# Patient Record
Sex: FEMALE | Race: WHITE | NOT HISPANIC OR LATINO | URBAN - METROPOLITAN AREA
[De-identification: names, ages, dates, MRNs, and addresses within clinical notes are randomized per-mention and may not be internally consistent; named-entity substitution may affect disease eponyms.]

---

## 2025-04-13 ENCOUNTER — OFFICE VISIT (OUTPATIENT)
Dept: URGENT CARE | Age: 55
End: 2025-04-13
Payer: COMMERCIAL

## 2025-04-13 DIAGNOSIS — N30.00 ACUTE CYSTITIS WITHOUT HEMATURIA: Primary | ICD-10-CM

## 2025-04-13 RX ORDER — NITROFURANTOIN 25; 75 MG/1; MG/1
100 CAPSULE ORAL EVERY 12 HOURS SCHEDULED
Qty: 14 CAPSULE | Refills: 0 | Status: SHIPPED | OUTPATIENT
Start: 2025-04-13 | End: 2025-04-20

## 2025-04-13 NOTE — PROGRESS NOTES
Urgent Care Virtual Video Visit    54-year-old patient presents virtually with complaints of pelvic pressure with associated bladder fullness, urinary urgency, urinary hesitancy, urinary frequency ongoing for the past 2 to 3 days.   Reports no treatments tried.   Reports not sexually active.  Reports no possibility of STI/STD.   Reports no possibility of pregnancy. Denies fevers, chills, nausea, vomiting, flank pain, hematuria, vaginal discharge.    General: Awake, alert and oriented. No acute distress. Well developed, and nourished. Appears stated age.  Head: The head is normocephalic and atraumatic.  Respiratory:  No signs of respiratory distress. No stridor, No conversational dyspnea.   Psychiatric: Appropriate mood and affect. Good judgement and insight.      pelvic pressure with associated bladder fullness, urinary urgency, urinary hesitancy, urinary frequency ongoing for the past 2 to 3 days.   Patient is unable to stop by urgent care clinic to drop off urine as patient works in the next hour and does not get off work until after 8 PM.   Symptoms are consistent with UTI.  Macrobid started.  Advised if symptoms are not improving over the next 2 to 3 days follow-up with PCP or at urgent care clinic.  Patient expressed understanding. In the event of high fever, pain, worsening of symptoms, nausea/vomiting pt should seek urgent/emergent medical treatment. Risk, benefits, and potential side effects of medication(s) discussed with pt. Discussed disease/illness presentation, treatment options, progression, complications, and outcomes with patient. Pt. Has expressed understanding and is an agreement of plan of care.     Patient Location: [facility]  Provider Location: [facility]    I have communicated my name and active licensure. Video visit completed with realtime synchronous video/audio connection. Informed consent was obtained from the patient. Patient was made aware that my evaluation and  diagnosis are limited due to the fact that we are not in the same room during the interview and that this is a virtual encounter that took place via videoconferencing. Patient verbalized understanding.     Patient disposition: Home    Electronically signed by Vidhi Campos PA-C  10:23 AM

## 2025-05-16 ENCOUNTER — HOSPITAL ENCOUNTER (OUTPATIENT)
Dept: RADIOLOGY | Facility: CLINIC | Age: 55
Discharge: HOME | End: 2025-05-16
Payer: COMMERCIAL

## 2025-05-16 VITALS — BODY MASS INDEX: 28.89 KG/M2 | HEIGHT: 62 IN | WEIGHT: 157 LBS

## 2025-05-16 DIAGNOSIS — Z12.31 ENCOUNTER FOR SCREENING MAMMOGRAM FOR MALIGNANT NEOPLASM OF BREAST: ICD-10-CM

## 2025-05-16 PROCEDURE — 77063 BREAST TOMOSYNTHESIS BI: CPT

## 2025-05-22 ENCOUNTER — HOSPITAL ENCOUNTER (OUTPATIENT)
Dept: RADIOLOGY | Facility: EXTERNAL LOCATION | Age: 55
Discharge: HOME | End: 2025-05-22

## 2025-06-09 ENCOUNTER — APPOINTMENT (OUTPATIENT)
Dept: PRIMARY CARE | Facility: CLINIC | Age: 55
End: 2025-06-09
Payer: COMMERCIAL

## 2025-06-09 VITALS
BODY MASS INDEX: 28.93 KG/M2 | DIASTOLIC BLOOD PRESSURE: 85 MMHG | HEART RATE: 67 BPM | OXYGEN SATURATION: 98 % | TEMPERATURE: 97.3 F | WEIGHT: 157.2 LBS | SYSTOLIC BLOOD PRESSURE: 135 MMHG | HEIGHT: 62 IN

## 2025-06-09 DIAGNOSIS — E78.00 SERUM CHOLESTEROL ELEVATED: ICD-10-CM

## 2025-06-09 DIAGNOSIS — F17.200 NICOTINE DEPENDENCE, UNCOMPLICATED, UNSPECIFIED NICOTINE PRODUCT TYPE: ICD-10-CM

## 2025-06-09 DIAGNOSIS — Z72.0 VAPES NICOTINE CONTAINING SUBSTANCE: ICD-10-CM

## 2025-06-09 DIAGNOSIS — Z76.89 ENCOUNTER TO ESTABLISH CARE WITH NEW DOCTOR: Primary | ICD-10-CM

## 2025-06-09 DIAGNOSIS — R03.0 ELEVATED BLOOD PRESSURE READING: ICD-10-CM

## 2025-06-09 DIAGNOSIS — Z13.1 SCREENING FOR DIABETES MELLITUS: ICD-10-CM

## 2025-06-09 DIAGNOSIS — Z13.89 SCREENING FOR OBESITY: ICD-10-CM

## 2025-06-09 DIAGNOSIS — N95.1 MENOPAUSE SYNDROME: ICD-10-CM

## 2025-06-09 DIAGNOSIS — L65.9 HAIR LOSS: ICD-10-CM

## 2025-06-09 RX ORDER — VENLAFAXINE HYDROCHLORIDE 37.5 MG/1
37.5 CAPSULE, EXTENDED RELEASE ORAL DAILY
Qty: 90 CAPSULE | Refills: 0 | Status: SHIPPED | OUTPATIENT
Start: 2025-06-09 | End: 2025-09-07

## 2025-06-09 ASSESSMENT — PATIENT HEALTH QUESTIONNAIRE - PHQ9
2. FEELING DOWN, DEPRESSED OR HOPELESS: NOT AT ALL
SUM OF ALL RESPONSES TO PHQ9 QUESTIONS 1 AND 2: 0
1. LITTLE INTEREST OR PLEASURE IN DOING THINGS: NOT AT ALL

## 2025-06-09 NOTE — PROGRESS NOTES
Subjective   Patient ID: Holli Garcia is a 54 y.o. female who presents for Establish Care (Discuss blood work and hormone concerns).    HPI Pt is a pleasant 53 yo CF who was seen and evaluated during the office visit as a new pt to establish. Pt states that she is a healthy person and does not take any medication. During the clinical encounter pt shared that she was seen and evaluated by the GYN for the menopause  sxs as hot flashes, mood swings, gain weight, sleep disturbances. Pt states that she has these sxs for the years. Pt recently had some BW done with GYN to check the sex hormones. The cholesterol panel was done as well and showed elevated cholesterol level. Pt states that her insurance plan does not cover for the HRT Tx and pt is looking for the alternative treatment option of the menopause sxs.   During the clinical encounter pt denies fever, chills, no SOB, no chest pain/tightness, no abdominal pain, no N/V/D reported, no change of urination reported.  Pt also mentioned about hair loss since 2020. Pt denies any other health concerns during this visit.  Sohx: current every day nicotine user (vape)  List of the medications and allergies: reviewed  PMHx: reviewed  Fhx: no Fhx of colon/breast CA    Review of Systems  The systems have been reviewed as follows:   Constitutional: no fever, no chills, but as mentioned  Eyes: no eyesight problems, no eye redness, no eye pain, no blurred vision  ENT: no ear pain or sore throat, no nasal discharge or congestion, no sinus pressure, no hearing loss  Cardiovascular: no chest pain or tightness, no SOB, the heart rate was not fast or slow  Respiratory: no cough, no dyspnea with exertion or with rest, no wheezing  Gastrointestinal: no abdominal pain, no constipation or diarrhea, no heartburn, no nausea or vomiting  Genitourinary: no urine frequency, no dysuria, no urinary urgency, no urinary incontinence or retention  Musculoskeletal: no back pain, no arthralgia, no  "joint swelling or stiffness, no muscle weakness  Integumentary: no skin lesions or rash  Neurological: no headaches, no dizziness or fainting, no limb weakness  Psychiatric: no anxiety or depression, no SI/HI, but as mentioned  Endocrine: no temperature intolerance, no   change of appetite  Hematologic/Lymphatic: no easy bruising or bleeding  Objective   /76 (BP Location: Left arm, Patient Position: Sitting)   Pulse 67   Temp 36.3 °C (97.3 °F) (Skin)   Ht 1.575 m (5' 2\")   Wt 71.3 kg (157 lb 3.2 oz)   SpO2 98%   BMI 28.75 kg/m²     Physical Exam  Constitutional: well hydrated, well nourished, no acute distress. Vital signs reviewed  Head and face: normocephalic, atraumatic  Eyes: no erythema, edema or visible abnormalities of conjunctiva or lids. Pupils are equal, round and reactive to light. Extraocular movement normal  ENT: external ears without deformities, external ear canals without redness, swelling or tenderness. TMs normal color, normal landmarks, no fluid, non-retracted  Neck: full ROM, no adenopathy, neck was supple, thyroid gland not enlarged, no palpable nodules  Pulmonary: normal respiratory rate and effort. Lungs are clear to auscultation, no rales,no rhonchi or wheezing  Cardiovascular: RRR, normal S1 and S2, no murmur, no gallop, posterior tib. pulse normal 2+ bilaterally, no lower extremity edema  Abdomen: soft, non tender on palpation, not distended, normal BS in all 4 quadrants, no CVA tenderness  Musculoskeletal: no joint swelling, normal movements of all 4 extremities. Gait and station: normal, Digits and nails: normal without clubbing  Skin: normal color, no rash  Neurologic: Cranial nerves: CN II: visual acuity intact. Source: acuity reported by patient. Pupils reactive to light with normal accommodation. Right and left pupil normal CN III, IV and VI: the EO movements were intact. CN VIII: no hearing loss. Deep tendon reflexes: were 2+ and symmetric: R and L patella.  Sensory exam: " normal to light touch  Psychiatric: Mood and affect: normal, Alert and Oriented x 3  Lymphatic: no cervical lymphadenopathy  Assessment/Plan   New pt to establish:  Hx and PE as mentioned  Pt will need AWV    Obesity screening: BMI of 28 and pt will be beneficial form the lifestyle modification  Elevated BP readings: I rechecked the pts BP as documented at EMR. The readings material about the normal BP and how to check it on the regular basis was provided directly to the pt. Will also screen the pt for DM. Pt will share the recent BW results with us. Will check CMP  Elevated cholesterol level: pt was advised about the healthy. Low fat diet  Menopause sxs: will try venlafaxine 37.5 mg po daily x 60 days with close FU OV for the sxs management. Pt was educated about possible SE  Hair loss: will check CBC, Irin panel+ferritin, EDWARD, ESR/CRP  Every day smoker:  Tobacco cessation:  I have counseled the  patient about the need for smoking cessation and how I can support efforts when pt is ready to quit. D/c nicotine replacement therapy, pharmacological option (Bupropion and etc), alternative treatment - hypnosis, support groups, acupuncture as potential options. Pt currently has no signs/symptoms of tobacco related disease. Pt declined any intervention for now  I discussed every sxs with the pt in detail. Pt verbalized understanding of the health  condition and agreed with the clinical approach as discussed as mentioned above  Please FU with PCP as planned or sooner if needed.

## 2025-06-13 LAB
25(OH)D3+25(OH)D2 SERPL-MCNC: 23 NG/ML (ref 30–100)
ALBUMIN SERPL-MCNC: 4.4 G/DL (ref 3.6–5.1)
ALP SERPL-CCNC: 72 U/L (ref 37–153)
ALT SERPL-CCNC: 23 U/L (ref 6–29)
ANA PAT SER IF-IMP: ABNORMAL
ANA PAT SER IF-IMP: ABNORMAL
ANA SER QL IF: POSITIVE
ANA TITR SER IF: ABNORMAL TITER
ANA TITR SER IF: ABNORMAL TITER
ANION GAP SERPL CALCULATED.4IONS-SCNC: 9 MMOL/L (CALC) (ref 7–17)
AST SERPL-CCNC: 14 U/L (ref 10–35)
BASOPHILS # BLD AUTO: 32 CELLS/UL (ref 0–200)
BASOPHILS NFR BLD AUTO: 0.7 %
BILIRUB SERPL-MCNC: 0.3 MG/DL (ref 0.2–1.2)
BUN SERPL-MCNC: 17 MG/DL (ref 7–25)
CALCIUM SERPL-MCNC: 9.3 MG/DL (ref 8.6–10.4)
CHLORIDE SERPL-SCNC: 106 MMOL/L (ref 98–110)
CO2 SERPL-SCNC: 26 MMOL/L (ref 20–32)
CREAT SERPL-MCNC: 0.62 MG/DL (ref 0.5–1.03)
CRP SERPL HS-MCNC: 2.8 MG/L
EGFRCR SERPLBLD CKD-EPI 2021: 104 ML/MIN/1.73M2
EOSINOPHIL # BLD AUTO: 451 CELLS/UL (ref 15–500)
EOSINOPHIL NFR BLD AUTO: 9.8 %
ERYTHROCYTE [DISTWIDTH] IN BLOOD BY AUTOMATED COUNT: 12.3 % (ref 11–15)
ERYTHROCYTE [SEDIMENTATION RATE] IN BLOOD BY WESTERGREN METHOD: 11 MM/H
EST. AVERAGE GLUCOSE BLD GHB EST-MCNC: 114 MG/DL
EST. AVERAGE GLUCOSE BLD GHB EST-SCNC: 6.3 MMOL/L
FERRITIN SERPL-MCNC: 48 NG/ML (ref 16–232)
GLUCOSE SERPL-MCNC: 94 MG/DL (ref 65–99)
HBA1C MFR BLD: 5.6 %
HCT VFR BLD AUTO: 43 % (ref 35–45)
HGB BLD-MCNC: 14.1 G/DL (ref 11.7–15.5)
IRON SATN MFR SERPL: 28 % (CALC) (ref 16–45)
IRON SERPL-MCNC: 94 MCG/DL (ref 45–160)
LYMPHOCYTES # BLD AUTO: 1408 CELLS/UL (ref 850–3900)
LYMPHOCYTES NFR BLD AUTO: 30.6 %
MCH RBC QN AUTO: 29.1 PG (ref 27–33)
MCHC RBC AUTO-ENTMCNC: 32.8 G/DL (ref 32–36)
MCV RBC AUTO: 88.8 FL (ref 80–100)
MONOCYTES # BLD AUTO: 460 CELLS/UL (ref 200–950)
MONOCYTES NFR BLD AUTO: 10 %
NEUTROPHILS # BLD AUTO: 2249 CELLS/UL (ref 1500–7800)
NEUTROPHILS NFR BLD AUTO: 48.9 %
PLATELET # BLD AUTO: 291 THOUSAND/UL (ref 140–400)
PMV BLD REES-ECKER: 9.7 FL (ref 7.5–12.5)
POTASSIUM SERPL-SCNC: 4.9 MMOL/L (ref 3.5–5.3)
PROT SERPL-MCNC: 6.8 G/DL (ref 6.1–8.1)
RBC # BLD AUTO: 4.84 MILLION/UL (ref 3.8–5.1)
SODIUM SERPL-SCNC: 141 MMOL/L (ref 135–146)
TIBC SERPL-MCNC: 336 MCG/DL (CALC) (ref 250–450)
WBC # BLD AUTO: 4.6 THOUSAND/UL (ref 3.8–10.8)

## 2025-06-16 ENCOUNTER — TELEPHONE (OUTPATIENT)
Dept: PRIMARY CARE | Facility: CLINIC | Age: 55
End: 2025-06-16
Payer: COMMERCIAL

## 2025-06-16 NOTE — TELEPHONE ENCOUNTER
----- Message from Mariajose Call sent at 6/14/2025  1:52 PM EDT -----  The recent BW showed positive BW results for the autoimmune disorders and decreased vitamin D level. Please schedule the FU visit to discuss the bw results (VV will be ok).  ----- Message -----  From: Akbar Onfido Results In  Sent: 6/12/2025   3:59 AM EDT  To: Mariajose Call MD

## 2025-07-07 ENCOUNTER — APPOINTMENT (OUTPATIENT)
Dept: PRIMARY CARE | Facility: CLINIC | Age: 55
End: 2025-07-07
Payer: COMMERCIAL

## 2025-07-07 VITALS
DIASTOLIC BLOOD PRESSURE: 86 MMHG | SYSTOLIC BLOOD PRESSURE: 137 MMHG | WEIGHT: 152.4 LBS | HEART RATE: 72 BPM | BODY MASS INDEX: 28.05 KG/M2 | TEMPERATURE: 97.9 F | HEIGHT: 62 IN

## 2025-07-07 DIAGNOSIS — E66.3 OVERWEIGHT WITH BODY MASS INDEX (BMI) OF 27 TO 27.9 IN ADULT: ICD-10-CM

## 2025-07-07 DIAGNOSIS — N95.1 MENOPAUSE SYNDROME: ICD-10-CM

## 2025-07-07 DIAGNOSIS — R76.8 ANTINUCLEAR ANTIBODY (ANA) POSITIVE: ICD-10-CM

## 2025-07-07 DIAGNOSIS — Z72.0 VAPES NICOTINE CONTAINING SUBSTANCE: ICD-10-CM

## 2025-07-07 DIAGNOSIS — Z71.2 ENCOUNTER TO DISCUSS TEST RESULTS: Primary | ICD-10-CM

## 2025-07-07 DIAGNOSIS — R03.0 ELEVATED BLOOD PRESSURE READING WITHOUT DIAGNOSIS OF HYPERTENSION: ICD-10-CM

## 2025-07-07 DIAGNOSIS — E55.9 VITAMIN D INSUFFICIENCY: ICD-10-CM

## 2025-07-07 DIAGNOSIS — Z71.6 ENCOUNTER FOR SMOKING CESSATION COUNSELING: ICD-10-CM

## 2025-07-07 PROCEDURE — 3008F BODY MASS INDEX DOCD: CPT | Performed by: INTERNAL MEDICINE

## 2025-07-07 PROCEDURE — 4004F PT TOBACCO SCREEN RCVD TLK: CPT | Performed by: INTERNAL MEDICINE

## 2025-07-07 PROCEDURE — 99215 OFFICE O/P EST HI 40 MIN: CPT | Performed by: INTERNAL MEDICINE

## 2025-07-07 PROCEDURE — 99406 BEHAV CHNG SMOKING 3-10 MIN: CPT | Performed by: INTERNAL MEDICINE

## 2025-07-07 RX ORDER — ERGOCALCIFEROL 1.25 MG/1
1.25 CAPSULE ORAL WEEKLY
Qty: 12 CAPSULE | Refills: 0 | Status: SHIPPED | OUTPATIENT
Start: 2025-07-07 | End: 2025-09-29

## 2025-07-07 ASSESSMENT — PATIENT HEALTH QUESTIONNAIRE - PHQ9
1. LITTLE INTEREST OR PLEASURE IN DOING THINGS: NOT AT ALL
2. FEELING DOWN, DEPRESSED OR HOPELESS: NOT AT ALL
SUM OF ALL RESPONSES TO PHQ9 QUESTIONS 1 AND 2: 0

## 2025-07-07 NOTE — PROGRESS NOTES
"Subjective   Patient ID: Holli Garcia is a 54 y.o. female who presents for Follow-up (Elevated Bp, nicotine dependence).    HPI Pt is a pleasant 53 yo CF who was seen and evaluated during the FU OV. Pt recently had the BW done and would like to discuss the results in details. During the clinical encounter pt reports significant improvement of the menopause sxs on Effexor  37.5 mg PO daily. During the clinical encounter pt denies fever, chills, no SOB, no chest pain/tightness, no abdominal pain, no N/V/D reported, no change of urination reported. Pt denies any other health concerns during this visit.  Sohx: reviewed  List of the medications and allergies: reviewed  Fhx  and PMHx: reviewed    Review of Systems  The systems have been reviewed as follows:   Constitutional: no fever, no chills  Eyes: no eyesight problems, no eye redness, no eye pain, no blurred vision  ENT: no ear pain or sore throat, no nasal discharge or congestion, no sinus pressure, no hearing loss  Cardiovascular: no chest pain or tightness, no SOB, the heart rate was not fast or slow  Respiratory: no cough, no dyspnea with exertion or with rest, no wheezing  Gastrointestinal: no abdominal pain, no constipation or diarrhea, no heartburn, no nausea or vomiting  Genitourinary: no urine frequency, no dysuria, no urinary urgency, no urinary incontinence or retention  Musculoskeletal: no back pain, no arthralgia, no joint swelling or stiffness, no muscle weakness  Integumentary: no skin lesions or rash  Neurological: no headaches, no dizziness or fainting, no limb weakness  Psychiatric: no mood changes, no anxiety or depression, no SI/HI  Endocrine: no weight change, no temperature intolerance, no change of appetite  Hematologic/Lymphatic: no easy bruising or bleeding    Objective   /86 (BP Location: Left arm, Patient Position: Sitting)   Pulse 72   Temp 36.6 °C (97.9 °F)   Ht 1.575 m (5' 2\")   Wt 69.1 kg (152 lb 6.4 oz)   BMI 27.87 kg/m² "     Physical Exam  Constitutional: well hydrated, well nourished, no acute distress. Vital signs reviewed  Head and face: normocephalic, atraumatic  Eyes: no erythema, edema or visible abnormalities of conjunctiva or lids. Pupils are equal, round and reactive to light. Extraocular movement normal  ENT: external ears without deformities  Neck: full ROM, no adenopathy, neck was supple, thyroid gland not enlarged  Pulmonary: normal respiratory rate and effort. Lungs are clear to auscultation, no rales,no rhonchi or wheezing  Cardiovascular: RRR, normal S1 and S2, no murmur, no gallop, posterior tib. pulse normal 2+ bilaterally, no lower extremity edema  Abdomen: soft, non tender on palpation, not distended, normal BS in all 4 quadrants, no CVA tenderness  Musculoskeletal: no joint swelling, normal movements of all 4 extremities. Gait and station: normal, Digits and nails: normal without clubbing  Skin: normal color, no rash  Neurologic: Cranial nerves: CN II: visual acuity intact. Source: acuity reported by patient. Pupils reactive to light with normal accommodation. Right and left pupil normal CN III, IV and VI: the EO movements were intact. CN VIII: no hearing loss. Deep tendon reflexes: were 2+ and symmetric: R and L patella.  Sensory exam: normal to light touch  Psychiatric: Mood and affect: normal, Alert and Oriented x 3  Lymphatic: no cervical lymphadenopathy    Assessment/Plan   Encounter to discuss the recent test results:  I reviewed, shared and discussed the recent test results with the pt in details. Pt verbalized understanding  Elevated EDWARD test results: will provide the referral for the rheumatology team evaluation  Vit D insufficiency: will start on weekly vitamin D supplemental dose of 50.000 IUn for the next 12 weeks  Elevated BP without DS of HTN: Pt states that the BP readings at home were 119/70-130s. Continue to monitor  Menopause SXS: well controlled on the current dose of venlafaxine 37.5 mg po  daily  Some day nicotine user (vape): Tobacco cessation:  I have counseled the  patient about the need for smoking cessation and how I can support efforts when pt is ready to quit. D/c nicotine replacement therapy, pharmacological option (Bupropion and etc), alternative treatment - hypnosis, support groups, acupuncture as potential options. Pt currently has no signs/symptoms of tobacco related disease.       A comprehensive discussion regarding all presenting signs and symptoms was conducted with the patient. The patient demonstrated understanding of the diagnosed health condition and expressed agreement with the proposed clinical management plan as outlined above. The patient is advised to follow up with their primary care provider (PCP) as scheduled, or earlier if clinically indicated.

## 2025-07-18 ENCOUNTER — APPOINTMENT (OUTPATIENT)
Facility: CLINIC | Age: 55
End: 2025-07-18
Payer: COMMERCIAL

## 2025-07-18 VITALS
HEIGHT: 62 IN | SYSTOLIC BLOOD PRESSURE: 160 MMHG | WEIGHT: 147 LBS | BODY MASS INDEX: 27.05 KG/M2 | DIASTOLIC BLOOD PRESSURE: 87 MMHG | TEMPERATURE: 97.9 F | HEART RATE: 76 BPM

## 2025-07-18 DIAGNOSIS — F51.01 PRIMARY INSOMNIA: ICD-10-CM

## 2025-07-18 DIAGNOSIS — R76.8 ANA POSITIVE: Primary | ICD-10-CM

## 2025-07-18 PROCEDURE — 3008F BODY MASS INDEX DOCD: CPT | Performed by: INTERNAL MEDICINE

## 2025-07-18 PROCEDURE — 99204 OFFICE O/P NEW MOD 45 MIN: CPT | Performed by: INTERNAL MEDICINE

## 2025-07-18 ASSESSMENT — RHEUMATOLOGY NEW PATIENT QUESTIONNAIRE
UNUSUAL FATIGUE: Y
MEMORY LOSS: Y
BEHAVIORAL CHANGES: Y
COLOR CHANGES OF HANDS OR FEET IN THE COLD: Y
EASILY LOSING TEMPER: Y
NUMBNESS OR TINGLING IN HANDS OR FEET: Y
DIFFICULTY STAYING ASLEEP: Y
JOINT SWELLING: Y
EYE DRYNESS: Y
LIST JOINTS AFFECTED BY SWELLING IN THE PAST MONTH: HANDS
DRYNESS OF MOUTH: Y
DIFFICULTY FALLING ASLEEP: Y
EXCESSIVE HAIR LOSS (MORE THAN YOUR NORM): Y

## 2025-07-18 NOTE — PROGRESS NOTES
Subjective   Patient ID: [id number]   Holli Garcia is a 55 y.o. female who presents for Abnormal Lab and Joint Swelling (Hand swelling ).  HPI    Here for positive EDWARD 1:320 nucelolar pattern  Performed for c/o alopecia - 2 years  And was referred to rheum because of this  She has no scarring, no bald patches, hair break off easily.  Has cut her hair recently  Has minimal dryness in the eyes , wears contacts, no xerostomia, choking  Has fatigue, poor sleep quality , daytime somnolence    Describes fingers turn white on cold exposure but no triphasic changes  Has had 3 miscarriages previously in the first trimester, and one miscarriage in the early 2nd trimester  Has 1 daughter, normal delivery  No VTE , no prior pre eclampsia    Denies fever, chills, weight loss, night sweats or headaches  enies vision, redness or pain or photophobia  Denies  dental loss, loss of taste, nasal or oral ulcers, jaw claudication, difficulty swallowing, nasal crusting or recurrent sinus infections   Denies chest pain, palpitations, orthopnea  Denies shortness of breath, cough, asthma, or recurrent respiratory infections  Gastrointestinal: Denies dysphagia, nausea, vomiting, heartburn, abdominal pain, constipation, diarrhea, melena or hematochezia  No recurrent urinary infections or STDs, no genital or anal ulcers.   Denies photosensitivity, rash or lesions, Raynaud's phenomenon, skin tightening, digital ulcers, psoriatic lesions, or alopecia  Denies any numbness or tingling, muscle weakness, or incontinence    Denies bleeding, bruising, history of clots (arterial or venous),  No joint pains, redness, hotness or swelling. No inflammatory back pain, enthesitis, dactylitis. No morning stiffness   Denies weakness, difficulty rising from chair or combing the hair, muscle aches, or problems with hand      FHx: No family history of autoimmune diseases         Problem List[1]     Medical History[2]     Surgical History[3]     Social History      Socioeconomic History    Marital status:      Spouse name: Not on file    Number of children: Not on file    Years of education: Not on file    Highest education level: Not on file   Occupational History    Not on file   Tobacco Use    Smoking status: Every Day     Types: Cigarettes    Smokeless tobacco: Current    Tobacco comments:     Smoking cessation provided   Vaping Use    Vaping status: Every Day   Substance and Sexual Activity    Alcohol use: Not Currently    Drug use: Yes     Types: Marijuana    Sexual activity: Not Currently     Birth control/protection: Post-menopausal   Other Topics Concern    Not on file   Social History Narrative    Not on file     Social Drivers of Health     Financial Resource Strain: Not on file   Food Insecurity: Not on file   Transportation Needs: Not on file   Physical Activity: Not on file   Stress: Not on file   Social Connections: Not on file   Intimate Partner Violence: Not on file   Housing Stability: Not on file        RX Allergies[4]     Current Medications[5]       Objective     Visit Vitals  /87   Pulse 76   Temp 36.6 °C (97.9 °F)        Physical Exam  General: AAOx3, Cooperative  Head: normocephalic, atraumatic  Eyes: EOMI, conjunctiva clear, sclera white, anicteric  Ears: no redness, swelling, tenderness  Nose: no deformity, no crusting   Throat/Mouth: No oral deformities, no cheek swelling, mucosa appear moist, no oral ulcers noted or loss of dentition   Neck/Lymph: FROM, trachea midline  Lungs: chest expansion symmetric. No respiratory distress.   Heart: RRR  Neuro: CN II-XII grossly intact, no focal deficit  Skin: No rashes, ulcers or photosensitive areas  MSK: Upper Extremities:  Hand/Fingers: No erythema, swelling, tenderness or warmth at DIP, PIP, or MCP joints, FROM grossly. Good hand . No nodules. No deformities   Wrists: No erythema, swelling, warmth or tenderness at wrist, FROM grossly  Elbows: No tenderness, swelling, erythema or warmth  "at elbows, FROM grossly. No nodules   Shoulders: No swelling, erythema, tenderness or warmth at shoulders. FROM  Lower Extremities:   Hips: No obvious deformities. No joint tenderness, normal ROM grossly. Log roll test negative bilaterally. Anayeli test is negative bilaterally. No trochanteric bursae TTP  Knees: No tenderness, deformities, swelling, rashes, or warmth, normal ROM grossly. No crepitus, no pes anserine bursa TTP   Ankles: No deformities, tenderness, edema, erythema, ulceration, or warmth at the ankle  Feet: Negative MTP squeeze. Normal ROM grossly.   Spine: No spinal tenderness to palpation. No SI joint tenderness. Gaenslen test negative       [unfilled]      Lab Results   Component Value Date    WBC 4.6 06/11/2025    HGB 14.1 06/11/2025    HCT 43.0 06/11/2025    MCV 88.8 06/11/2025     06/11/2025        Chemistry    Lab Results   Component Value Date/Time     06/11/2025 1318    K 4.9 06/11/2025 1318     06/11/2025 1318    CO2 26 06/11/2025 1318    BUN 17 06/11/2025 1318    CREATININE 0.62 06/11/2025 1318    Lab Results   Component Value Date/Time    CALCIUM 9.3 06/11/2025 1318    ALKPHOS 72 06/11/2025 1318    AST 14 06/11/2025 1318    ALT 23 06/11/2025 1318    BILITOT 0.3 06/11/2025 1318           No results found for: \"CRP\"   Lab Results   Component Value Date    EDWARD POSITIVE (A) 06/11/2025    SEDRATE 11 06/11/2025      No results found for: \"CKTOTAL\"  No results found for: \"NEUTROABS\"   Lab Results   Component Value Date    FERRITIN 48 06/11/2025      No results found for: \"HEPATOT\", \"HEPAIGM\", \"HEPBCIGM\", \"HEPBCAB\", \"HBEAG\", \"HEPCAB\"   Lab Results   Component Value Date    ALT 23 06/11/2025    AST 14 06/11/2025    ALKPHOS 72 06/11/2025    BILITOT 0.3 06/11/2025      No results found for: \"PPD\"   No results found for: \"URICACID\"   Lab Results   Component Value Date    CALCIUM 9.3 06/11/2025      No results found for: \"SPEP\", \"UPEP\"   No results found for: \"ALBUR\", \"ZGH25XKN\" "   .last          BI mammo bilateral screening tomosynthesis  Narrative: Interpreted By:  Chantel Toledo,   STUDY:  BI MAMMO BILATERAL SCREENING TOMOSYNTHESIS;  5/16/2025 3:54 pm      ACCESSION NUMBER(S):  [id number]      ORDERING CLINICIAN:  MARGIE OVIEDO      INDICATION:  Screening.      ,Z12.31 Encounter for screening mammogram for malignant neoplasm of  breast      COMPARISON:  12/29/2022, 12/20/2021.      FINDINGS:  2D and tomosynthesis images were reviewed at 1 mm slice thickness.      Density:  The breasts are heterogeneously dense, which may obscure  small masses.      No suspicious masses or calcifications are identified.      Impression: No mammographic evidence of malignancy.      BI-RADS CATEGORY:  BI-RADS Category:  1 Negative.  Recommendation:  Annual Screening.  Recommended Date:  1 Year.  Laterality:  Bilateral.              For any future breast imaging appointments, please call [phone]  ([phone]).          MACRO:  None      Signed by: Chantel Toledo 5/22/2025 3:51 PM  Dictation workstation:   [id number]  BI transfer of outside films  Outside images for comparison or treatment purposes, not interpreted by    Radiologists.  BI transfer of outside films  Outside images for comparison or treatment purposes, not interpreted by    Radiologists.                  Assessment/Plan   Diagnoses and all orders for this visit:  EDWARD positive  Patient was counseled that positive EDWARD alone is not diagnostic of any underlying autoimmune CTD and must be interpreted with in the right clinical context  Explained that EDWARD is a screening test and can be abnormal in upto 20% of healthy individuals. The risk of developing autoimmune disease with a low pre test probability in future is extremely low (< 1%). Will get labs for completion  At this point, it is unlikely that her alopecia is related to a systemic autoimmune illness    She was advised to wear gloves in cold temperatures, there are no secondary raynauds  changes in the hand exam    -     JAG Panel; Future  -     C3 Complement; Future  -     C4 Complement; Future  -     Creatinine, Urine Random; Future  -     Protein, Urine Random; Future  -     Urinalysis with Reflex Microscopic; Future  Primary insomnia  -     Referral to Adult Sleep Medicine; Future  Other orders  -     Referral to Rheumatology       Follow up contingent on requested tests    Julio Dutta MD FACP FACR   of Medicine  [facility] - Department of Rheumatology  [facility] Plan, including risks and benefits, was discussed with the patient, informed on how to reach us.     To schedule an appointment, call  [phone] [place] or call [phone] for [facility]        [1]   Patient Active Problem List  Diagnosis    Serum cholesterol elevated    Encounter for smoking cessation counseling    Overweight with body mass index (BMI) of 27 to 27.9 in adult    Elevated blood pressure reading without diagnosis of hypertension    Vapes nicotine containing substance    Nicotine dependence, uncomplicated    Menopause syndrome    Antinuclear antibody (EDWARD) positive    Vitamin D insufficiency   [2] History reviewed. No pertinent past medical history.  [3]   Past Surgical History:  Procedure Laterality Date    TUBAL LIGATION  2007   [4] No Known Allergies  [5]   Current Outpatient Medications:     ergocalciferol (Vitamin D-2) 1250 mcg (50,000 units) capsule, Take 1 capsule (1.25 mg) by mouth 1 (one) time per week., Disp: 12 capsule, Rfl: 0    venlafaxine XR (Effexor XR) 37.5 mg 24 hr capsule, Take 1 capsule (37.5 mg) by mouth once daily. Do not crush or chew., Disp: 90 capsule, Rfl: 0

## 2025-07-18 NOTE — PATIENT INSTRUCTIONS
Sera do the blood and urine test    Will let you know about follow ups    See a sleep medicine doctor for the recurrent awakenings

## 2025-07-19 LAB
APPEARANCE UR: CLEAR
BACTERIA #/AREA URNS HPF: ABNORMAL /HPF
BILIRUB UR QL STRIP: NEGATIVE
COLOR UR: YELLOW
CREAT UR-MCNC: 25 MG/DL (ref 20–275)
CREAT UR-MCNC: 25 MG/DL (ref 20–275)
GLUCOSE UR QL STRIP: NEGATIVE
HGB UR QL STRIP: NEGATIVE
HYALINE CASTS #/AREA URNS LPF: ABNORMAL /LPF
KETONES UR QL STRIP: NEGATIVE
LEUKOCYTE ESTERASE UR QL STRIP: ABNORMAL
NITRITE UR QL STRIP: NEGATIVE
PH UR STRIP: 7 [PH] (ref 5–8)
PROT UR QL STRIP: NEGATIVE
PROT UR-MCNC: <4 MG/DL (ref 5–24)
PROT/CREAT UR: ABNORMAL MG/G CREAT (ref 24–184)
PROT/CREAT UR: ABNORMAL MG/MG CREAT (ref 0.02–0.18)
RBC #/AREA URNS HPF: ABNORMAL /HPF
SERVICE CMNT-IMP: ABNORMAL
SP GR UR STRIP: 1 (ref 1–1.03)
SQUAMOUS #/AREA URNS HPF: ABNORMAL /HPF
WBC #/AREA URNS HPF: ABNORMAL /HPF

## 2025-07-21 LAB
C3 SERPL-MCNC: 154 MG/DL (ref 83–193)
C4 SERPL-MCNC: 31 MG/DL (ref 15–57)
CENTROMERE B AB SER-ACNC: NORMAL AI
DSDNA AB SER-ACNC: <1 IU/ML
ENA JO1 AB SER IA-ACNC: NORMAL AI
ENA RNP AB SER-ACNC: NORMAL AI
ENA SCL70 AB SER IA-ACNC: NORMAL AI
ENA SM AB SER IA-ACNC: NORMAL AI
ENA SM+RNP AB SER IA-ACNC: NORMAL AI
ENA SS-A AB SER IA-ACNC: NORMAL AI
ENA SS-B AB SER IA-ACNC: NORMAL AI
NUCLEOSOME AB SER IA-ACNC: NORMAL AI
RIBOSOMAL P AB SER-ACNC: NORMAL AI

## 2026-01-12 ENCOUNTER — APPOINTMENT (OUTPATIENT)
Dept: PRIMARY CARE | Facility: CLINIC | Age: 56
End: 2026-01-12
Payer: COMMERCIAL